# Patient Record
Sex: FEMALE | Race: WHITE | Employment: FULL TIME | URBAN - METROPOLITAN AREA
[De-identification: names, ages, dates, MRNs, and addresses within clinical notes are randomized per-mention and may not be internally consistent; named-entity substitution may affect disease eponyms.]

---

## 2021-02-12 ENCOUNTER — OFFICE VISIT (OUTPATIENT)
Dept: ORTHOPEDICS CLINIC | Facility: CLINIC | Age: 56
End: 2021-02-12
Payer: COMMERCIAL

## 2021-02-12 DIAGNOSIS — L60.0 ONYCHOCRYPTOSIS: Primary | ICD-10-CM

## 2021-02-12 PROCEDURE — 99202 OFFICE O/P NEW SF 15 MIN: CPT | Performed by: PODIATRIST

## 2021-02-12 PROCEDURE — 11750 EXCISION NAIL&NAIL MATRIX: CPT | Performed by: PODIATRIST

## 2021-02-12 RX ORDER — RIBOFLAVIN (VITAMIN B2) 100 MG
100 TABLET ORAL DAILY
COMMUNITY

## 2021-02-12 RX ORDER — MULTIVIT-MIN/IRON/FOLIC ACID/K 18-600-40
CAPSULE ORAL
COMMUNITY

## 2021-02-12 RX ORDER — ESCITALOPRAM OXALATE 20 MG/1
20 TABLET ORAL DAILY
COMMUNITY
Start: 2020-11-17

## 2021-02-12 RX ORDER — ZOLPIDEM TARTRATE 10 MG/1
10 TABLET ORAL NIGHTLY
COMMUNITY
Start: 2021-01-20

## 2021-02-12 NOTE — PROGRESS NOTES
[facility] New Patient Note    CC: Patient presents with:  Ingrown Toenail: Patient is here today for Bilateral ingrown great toe nails.        HPI: The patient is a 54year old female who presents today with complaints of chronic ingrown nails is incurvated. Mildly thickened. Right great toenail mild thickening as well of the nail with lateral corner incurvated no signs of infection today.   Assessment/Diagnoses:  Diagnoses and all orders for this visit:    Onychocryptosis    Both great toes la

## 2021-02-28 ENCOUNTER — PATIENT MESSAGE (OUTPATIENT)
Dept: ORTHOPEDICS CLINIC | Facility: CLINIC | Age: 56
End: 2021-02-28

## 2021-03-01 NOTE — TELEPHONE ENCOUNTER
I spoke with Dr. Willi Wu. She would like me to call in Clindamycin 150mg TID for 5 days. I called it in to [facility] at [phone] as requested by [place].

## 2021-03-09 ENCOUNTER — OFFICE VISIT (OUTPATIENT)
Dept: ORTHOPEDICS CLINIC | Facility: CLINIC | Age: 56
End: 2021-03-09
Payer: COMMERCIAL

## 2021-03-09 DIAGNOSIS — L03.039 PARONYCHIA OF TOE, UNSPECIFIED LATERALITY: Primary | ICD-10-CM

## 2021-03-09 PROCEDURE — 99213 OFFICE O/P EST LOW 20 MIN: CPT | Performed by: PODIATRIST

## 2021-03-09 RX ORDER — NEOMYCIN SULFATE, POLYMYXIN B SULFATE, HYDROCORTISONE 3.5; 10000; 1 MG/ML; [USP'U]/ML; MG/ML
SOLUTION/ DROPS AURICULAR (OTIC)
COMMUNITY
Start: 2020-05-20

## 2021-03-09 RX ORDER — NAPROXEN SODIUM 220 MG
220 TABLET ORAL
COMMUNITY

## 2021-03-09 RX ORDER — CLINDAMYCIN HYDROCHLORIDE 150 MG/1
150 CAPSULE ORAL 3 TIMES DAILY
Qty: 15 CAPSULE | Refills: 0 | Status: SHIPPED | OUTPATIENT
Start: 2021-03-09 | End: 2021-03-14

## 2021-03-09 RX ORDER — ESCITALOPRAM OXALATE 20 MG/1
TABLET ORAL DAILY
COMMUNITY

## 2021-03-09 RX ORDER — LOTEPREDNOL ETABONATE 5 MG/G
1 GEL OPHTHALMIC DAILY
COMMUNITY
Start: 2018-12-12

## 2021-03-09 NOTE — PROGRESS NOTES
[facility] Progress Note    Subjective: [place] is here about 3 weeks post P&A procedures for recurrent ingrown nails and previous P&A procedures with another podiatrist both great toes.   She did get infection and last week we had called in a cou

## 2021-03-23 ENCOUNTER — PATIENT MESSAGE (OUTPATIENT)
Dept: ORTHOPEDICS CLINIC | Facility: CLINIC | Age: 56
End: 2021-03-23

## 2021-03-23 NOTE — TELEPHONE ENCOUNTER
From: Michial Jeans  To: Rizwana Pike. Prashant Liang DPM  Sent: 3/23/2021 9:48 AM CDT  Subject: Visit Follow-up Question    Good morning Dr Prashant Liang,    I'm not sure you got my previous message sent to you, but I am still having trouble with my right toe.  Still p

## 2021-03-26 ENCOUNTER — OFFICE VISIT (OUTPATIENT)
Dept: ORTHOPEDICS CLINIC | Facility: CLINIC | Age: 56
End: 2021-03-26
Payer: COMMERCIAL

## 2021-03-26 DIAGNOSIS — L03.031 PARONYCHIA OF TOE OF RIGHT FOOT: Primary | ICD-10-CM

## 2021-03-26 PROCEDURE — 99213 OFFICE O/P EST LOW 20 MIN: CPT | Performed by: PODIATRIST

## 2021-03-26 NOTE — PROGRESS NOTES
[facility] Progress Note    Subjective:   Rod Duran comes in today for follow-up of her right great toe almost 2 months post phenol procedure for chronic recurrent ingrown nail, she had already had to P&A's again that same border by another podiatr

## 2021-04-08 ENCOUNTER — PATIENT MESSAGE (OUTPATIENT)
Dept: ORTHOPEDICS CLINIC | Facility: CLINIC | Age: 56
End: 2021-04-08

## 2021-04-12 RX ORDER — AMOXICILLIN AND CLAVULANATE POTASSIUM 500; 125 MG/1; MG/1
TABLET, FILM COATED ORAL
Qty: 14 TABLET | Refills: 0 | OUTPATIENT
Start: 2021-04-12

## 2021-04-15 ENCOUNTER — OFFICE VISIT (OUTPATIENT)
Dept: ORTHOPEDICS CLINIC | Facility: CLINIC | Age: 56
End: 2021-04-15
Payer: COMMERCIAL

## 2021-04-15 VITALS — BODY MASS INDEX: 32.14 KG/M2 | WEIGHT: 200 LBS | HEIGHT: 66 IN

## 2021-04-15 DIAGNOSIS — L92.9 GRANULOMA OF GREAT TOE: Primary | ICD-10-CM

## 2021-04-15 PROCEDURE — 3008F BODY MASS INDEX DOCD: CPT | Performed by: PODIATRIST

## 2021-04-15 PROCEDURE — 99213 OFFICE O/P EST LOW 20 MIN: CPT | Performed by: PODIATRIST

## 2021-04-15 PROCEDURE — 88304 TISSUE EXAM BY PATHOLOGIST: CPT | Performed by: PODIATRIST

## 2021-04-15 NOTE — PROGRESS NOTES
[facility] Progress Note    Subjective: Dorcas David returns with continued recurrence of inflamed tissue and sore right great toe with drainage. She did go on the Augmentin about 2 days ago and developed some thrush so diarrhea so has discontinued.

## 2021-04-20 ENCOUNTER — OFFICE VISIT (OUTPATIENT)
Dept: ORTHOPEDICS CLINIC | Facility: CLINIC | Age: 56
End: 2021-04-20
Payer: COMMERCIAL

## 2021-04-20 ENCOUNTER — TELEPHONE (OUTPATIENT)
Dept: ORTHOPEDICS CLINIC | Facility: CLINIC | Age: 56
End: 2021-04-20

## 2021-04-20 DIAGNOSIS — L03.031 PARONYCHIA OF TOE OF RIGHT FOOT: Primary | ICD-10-CM

## 2021-04-20 PROCEDURE — 99212 OFFICE O/P EST SF 10 MIN: CPT | Performed by: PODIATRIST

## 2021-04-20 RX ORDER — ERYTHROMYCIN 500 MG/1
1 TABLET, DELAYED RELEASE ORAL 2 TIMES DAILY
Qty: 20 TABLET | Refills: 0 | Status: SHIPPED | OUTPATIENT
Start: 2021-04-20 | End: 2021-04-30

## 2021-04-20 NOTE — TELEPHONE ENCOUNTER
[facility] called to speak to [facility] regarding gel medication for patient. [facility] states that they do not have it in stock. Please call back. Thank you.

## 2021-04-20 NOTE — PROGRESS NOTES
[facility] Progress Note    Subjective: Pt is here with continued pain, granulation tissue and non healing  Pathology showed simply inflammatory cells and granulation tissue    Objective:   Right great toe - lateral border with granulation tissue,

## 2021-05-10 ENCOUNTER — PATIENT MESSAGE (OUTPATIENT)
Dept: ORTHOPEDICS CLINIC | Facility: CLINIC | Age: 56
End: 2021-05-10

## 2021-06-28 ENCOUNTER — PATIENT MESSAGE (OUTPATIENT)
Dept: ORTHOPEDICS CLINIC | Facility: CLINIC | Age: 56
End: 2021-06-28

## 2021-06-28 NOTE — TELEPHONE ENCOUNTER
From: Devi Sanchez  To: Teresa Merritt. Dimitri Steele DPM  Sent: 6/28/2021 11:38 AM CDT  Subject: Visit Follow-up Question    Hi Dr Dimitri Steele,,    [facility] [employer] [facility] you are well!   I'm sorry to say my left toe is now misbehaving (see attached picture) I have been soaking it and

## 2021-06-29 DIAGNOSIS — L03.032 PARONYCHIA OF TOE OF LEFT FOOT: Primary | ICD-10-CM

## 2021-06-29 RX ORDER — CLINDAMYCIN HYDROCHLORIDE 300 MG/1
300 CAPSULE ORAL 2 TIMES DAILY
Qty: 14 CAPSULE | Refills: 0 | Status: SHIPPED | OUTPATIENT
Start: 2021-06-29

## 2021-06-29 NOTE — TELEPHONE ENCOUNTER
Patient requesting rx to be sent to [facility] in [place], asked patient for pharmacy information to input into chart     I'd really sooner meet you for a cup of coffee! But thank you!  I am actually up [place] again so will need that prescription sent [facility]

## 2021-06-29 NOTE — TELEPHONE ENCOUNTER
Patient pharmacy updated, please send abx rx to:  [address], [place]   Telephone number [phone]

## 2022-06-06 ENCOUNTER — PATIENT MESSAGE (OUTPATIENT)
Dept: ORTHOPEDICS CLINIC | Facility: CLINIC | Age: 57
End: 2022-06-06

## (undated) NOTE — LETTER
Dr. Brittni Garcia D.P.M.   [facility] - ORTHOPEDICS  [address]  [phone]      Claribel Watters  2/12/2021    Post Operative Nail/Wart Instructions    Soaking solution: Epsom Salt: 1/2 cup